# Patient Record
Sex: FEMALE | Race: OTHER | Employment: OTHER | URBAN - METROPOLITAN AREA
[De-identification: names, ages, dates, MRNs, and addresses within clinical notes are randomized per-mention and may not be internally consistent; named-entity substitution may affect disease eponyms.]

---

## 2022-04-05 ENCOUNTER — NEW PATIENT (OUTPATIENT)
Dept: URBAN - METROPOLITAN AREA CLINIC 32 | Facility: CLINIC | Age: 77
End: 2022-04-05

## 2022-04-05 DIAGNOSIS — H26.493: ICD-10-CM

## 2022-04-05 DIAGNOSIS — H35.363: ICD-10-CM

## 2022-04-05 PROCEDURE — 92004 COMPRE OPH EXAM NEW PT 1/>: CPT

## 2022-04-05 PROCEDURE — 92015 DETERMINE REFRACTIVE STATE: CPT

## 2022-04-05 ASSESSMENT — VISUAL ACUITY
OS_CC: J1+
OD_SC: 20/70
OS_GLARE: 20/40
OS_SC: 20/60-1
OD_CC: 20/30
OD_CC: J1+
OD_SC: J7-1
OS_SC: J16
OS_CC: 20/20
OD_GLARE: 20/60

## 2022-04-05 ASSESSMENT — KERATOMETRY
OS_AXISANGLE2_DEGREES: 90
OD_AXISANGLE2_DEGREES: 100
OD_K1POWER_DIOPTERS: 44.50
OS_AXISANGLE_DEGREES: 180
OD_K2POWER_DIOPTERS: 42.50
OS_K1POWER_DIOPTERS: 43.75
OS_K2POWER_DIOPTERS: 42.00
OD_AXISANGLE_DEGREES: 10

## 2022-04-05 ASSESSMENT — TONOMETRY
OD_IOP_MMHG: 12
OS_IOP_MMHG: 13

## 2022-08-11 NOTE — PATIENT DISCUSSION
Patient made aware of 24/7 emergency services. No - the patient is unable to be screened due to medical condition

## 2024-05-06 ENCOUNTER — COMPREHENSIVE EXAM (OUTPATIENT)
Dept: URBAN - METROPOLITAN AREA CLINIC 32 | Facility: CLINIC | Age: 79
End: 2024-05-06

## 2024-05-06 DIAGNOSIS — H43.813: ICD-10-CM

## 2024-05-06 DIAGNOSIS — H35.033: ICD-10-CM

## 2024-05-06 DIAGNOSIS — H26.493: ICD-10-CM

## 2024-05-06 DIAGNOSIS — H35.363: ICD-10-CM

## 2024-05-06 PROCEDURE — 99214 OFFICE O/P EST MOD 30 MIN: CPT

## 2024-05-06 PROCEDURE — 92015 DETERMINE REFRACTIVE STATE: CPT

## 2024-05-06 ASSESSMENT — KERATOMETRY
OS_AXISANGLE_DEGREES: 180
OD_AXISANGLE2_DEGREES: 100
OS_K1POWER_DIOPTERS: 43.75
OD_AXISANGLE_DEGREES: 10
OS_AXISANGLE2_DEGREES: 90
OD_K2POWER_DIOPTERS: 42.50
OD_K1POWER_DIOPTERS: 44.50
OS_K2POWER_DIOPTERS: 42.00

## 2024-05-06 ASSESSMENT — VISUAL ACUITY
OD_CC: 20/30
OD_GLARE: 20/40
OS_CC: 20/30+1
OD_CC: J2
OS_GLARE: 20/40
OS_CC: J1

## 2024-05-06 ASSESSMENT — TONOMETRY
OS_IOP_MMHG: 09
OD_IOP_MMHG: 12

## 2024-07-01 ENCOUNTER — CONSULTATION/EVALUATION (OUTPATIENT)
Dept: URBAN - METROPOLITAN AREA CLINIC 32 | Facility: CLINIC | Age: 79
End: 2024-07-01

## 2024-07-01 DIAGNOSIS — L82.1: ICD-10-CM

## 2024-07-01 PROCEDURE — 99213 OFFICE O/P EST LOW 20 MIN: CPT

## 2024-07-01 ASSESSMENT — VISUAL ACUITY
OS_CC: J1+
OD_CC: 20/50
OS_CC: 20/20
OD_GLARE: 20/70
OS_GLARE: 20/40
OD_CC: J2-1

## 2024-07-01 ASSESSMENT — KERATOMETRY
OD_K2POWER_DIOPTERS: 42.50
OD_AXISANGLE_DEGREES: 10
OS_AXISANGLE_DEGREES: 180
OS_AXISANGLE2_DEGREES: 90
OD_K1POWER_DIOPTERS: 44.50
OS_K1POWER_DIOPTERS: 43.75
OD_AXISANGLE2_DEGREES: 100
OS_K2POWER_DIOPTERS: 42.00

## 2024-08-07 ASSESSMENT — KERATOMETRY
OS_AXISANGLE_DEGREES: 180
OD_AXISANGLE_DEGREES: 10
OD_K1POWER_DIOPTERS: 44.50
OS_K2POWER_DIOPTERS: 42.00
OD_AXISANGLE2_DEGREES: 100
OS_AXISANGLE2_DEGREES: 90
OS_K1POWER_DIOPTERS: 43.75
OD_K2POWER_DIOPTERS: 42.50

## 2024-08-08 ENCOUNTER — CLINIC PROCEDURE ONLY (OUTPATIENT)
Dept: URBAN - METROPOLITAN AREA CLINIC 32 | Facility: CLINIC | Age: 79
End: 2024-08-08

## 2024-08-08 DIAGNOSIS — L82.1: ICD-10-CM

## 2024-08-08 PROCEDURE — 67840 REMOVE EYELID LESION: CPT

## 2024-08-08 RX ORDER — ERYTHROMYCIN 5 MG/G
OINTMENT OPHTHALMIC TWICE A DAY
Start: 2024-08-08 | End: 2024-08-15

## 2025-05-12 ENCOUNTER — COMPREHENSIVE EXAM (OUTPATIENT)
Age: 80
End: 2025-05-12

## 2025-05-12 DIAGNOSIS — H43.813: ICD-10-CM

## 2025-05-12 DIAGNOSIS — H26.493: ICD-10-CM

## 2025-05-12 DIAGNOSIS — H31.092: ICD-10-CM

## 2025-05-12 DIAGNOSIS — H35.363: ICD-10-CM

## 2025-05-12 PROCEDURE — 99214 OFFICE O/P EST MOD 30 MIN: CPT

## 2025-05-12 PROCEDURE — 92250 FUNDUS PHOTOGRAPHY W/I&R: CPT

## 2025-05-12 PROCEDURE — 92015 DETERMINE REFRACTIVE STATE: CPT
